# Patient Record
Sex: FEMALE | Race: WHITE | NOT HISPANIC OR LATINO | URBAN - METROPOLITAN AREA
[De-identification: names, ages, dates, MRNs, and addresses within clinical notes are randomized per-mention and may not be internally consistent; named-entity substitution may affect disease eponyms.]

---
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
## 2018-12-12 ENCOUNTER — OFFICE VISIT - RIVER FALLS (OUTPATIENT)
Dept: FAMILY MEDICINE | Facility: CLINIC | Age: 18
End: 2018-12-12

## 2018-12-12 ASSESSMENT — MIFFLIN-ST. JEOR: SCORE: 1307.22

## 2019-01-04 ENCOUNTER — COMMUNICATION - RIVER FALLS (OUTPATIENT)
Dept: FAMILY MEDICINE | Facility: CLINIC | Age: 19
End: 2019-01-04

## 2019-01-04 ENCOUNTER — OFFICE VISIT - RIVER FALLS (OUTPATIENT)
Dept: FAMILY MEDICINE | Facility: CLINIC | Age: 19
End: 2019-01-04

## 2019-01-04 LAB — DEPRECATED S PYO AG THROAT QL EIA: NOT DETECTED

## 2019-01-04 ASSESSMENT — MIFFLIN-ST. JEOR: SCORE: 1319.02

## 2019-01-07 LAB — BACTERIA SPEC CULT: NORMAL

## 2022-02-12 VITALS
OXYGEN SATURATION: 99 % | DIASTOLIC BLOOD PRESSURE: 70 MMHG | TEMPERATURE: 97.7 F | HEIGHT: 62 IN | SYSTOLIC BLOOD PRESSURE: 119 MMHG | HEART RATE: 86 BPM | DIASTOLIC BLOOD PRESSURE: 60 MMHG | HEART RATE: 67 BPM | BODY MASS INDEX: 22.19 KG/M2 | WEIGHT: 125 LBS | WEIGHT: 120.6 LBS | SYSTOLIC BLOOD PRESSURE: 112 MMHG | TEMPERATURE: 98.4 F

## 2022-02-12 VITALS
HEART RATE: 80 BPM | SYSTOLIC BLOOD PRESSURE: 120 MMHG | TEMPERATURE: 98 F | HEIGHT: 62 IN | BODY MASS INDEX: 24.62 KG/M2 | OXYGEN SATURATION: 99 % | WEIGHT: 133.8 LBS | HEIGHT: 62 IN | TEMPERATURE: 97.8 F | SYSTOLIC BLOOD PRESSURE: 118 MMHG | HEART RATE: 60 BPM | BODY MASS INDEX: 25.1 KG/M2 | DIASTOLIC BLOOD PRESSURE: 68 MMHG | WEIGHT: 136.4 LBS | DIASTOLIC BLOOD PRESSURE: 70 MMHG

## 2022-02-12 VITALS
DIASTOLIC BLOOD PRESSURE: 84 MMHG | WEIGHT: 125 LBS | TEMPERATURE: 97.2 F | HEART RATE: 68 BPM | SYSTOLIC BLOOD PRESSURE: 118 MMHG

## 2022-02-12 VITALS
HEART RATE: 64 BPM | WEIGHT: 124 LBS | SYSTOLIC BLOOD PRESSURE: 104 MMHG | HEIGHT: 62 IN | BODY MASS INDEX: 22.82 KG/M2 | DIASTOLIC BLOOD PRESSURE: 66 MMHG | TEMPERATURE: 98.1 F

## 2022-02-15 NOTE — PROGRESS NOTES
Patient:   JOHN DOBBS            MRN: [id number]            FIN: [id number]               Age:   18 years     Sex:  Female     :  2000   Associated Diagnoses:   Family planning   Author:   Kaylan Harmon      Visit Information      Date of Service: 2018 10:19 am  Performing Location: [facility]  Encounter#: [id number]      Chief Complaint   2018 10:33 AM CST  c/o being 2 1/2 weeks late for period, uses OCP and states she never misses a dose. States she does have abnormal menses sometimes though.        Interval History   Concerning symptoms as listed in Chief Complaint above discussed and confirmed with patient  here with her bF Jaime  She is worried she is preg because she didnt get period this past week with plabcebo week of oc's. She has btb at other weeks and then she will stop taking the pills after the BTB and when that ends she will start the pills again.  has used at least 3 different oc's over the last year or so, trying to find something that doesn't cause btb  When she is NOT on Ocs, she has regular predictable periods.   has only used oc's in past  partner rarely uses condoms         Health Status   Allergies:    Allergic Reactions (Selected)  Severity Not Documented  Amoxicillin (Rash)  Ceclor (Hives)   Medications:  (Selected)   Documented Medications  Documented  Microgestin 1.5/30 oral tablet: 1 tab(s), Oral, daily, 0 Refill(s), Type: Maintenance   Problem list:    All Problems  Allergic rhinitis / SNOMED CT 951466487 / Confirmed      Histories   Past Medical History:    Active  Allergic rhinitis ([id number])   Family History:    No family history items have been selected or recorded.   Procedure history:    No active procedure history items have been selected or recorded.   Social History:             No active social history items have been recorded.      Physical Examination   Vital Signs   2018 10:33 AM CST Temperature Tympanic 97.8 DegF  LOW     Peripheral Pulse Rate 60 bpm    Pulse Site Radial artery    HR Method Manual    Systolic Blood Pressure 120 mmHg    Diastolic Blood Pressure 70 mmHg    Mean Arterial Pressure 87 mmHg    BP Site Right arm    BP Method Manual      Measurements from flowsheet : Measurements   12/12/2018 10:33 AM CST Height Measured - Standard 61.5 in    Weight Measured - Standard 133.8 lb    BSA 1.62 m2    Body Mass Index 24.87 kg/m2      General:  Alert and oriented, No acute distress.       Review / Management   Results review:  Lab results: 12/12/2018 10:32 AM CST  U HCG POC                 NEGATIVE  .       Impression and Plan   Diagnosis     Family planning (GIP02-SI Z30.09).     Patient Instructions:       Counseled: Patient, Regarding diagnosis, Regarding treatment, Regarding medications, Verbalized understanding, 15 minutes spent with this pt, all on counseling regarding the use/risks/benefits of various birthcontrol methods including ACHES symptoms and when to start her methods and the importance of back up protection as well as condom use to prevent STI's   specifically counseled on use/risk/benefit of Nexplanon as she is not interested in IUD.  Should go back to using her oc's, cycling continuously and check insurance covereage of Nexpalnon and return for insert if desires. Risk of infection, migration discussed.

## 2022-02-15 NOTE — PROGRESS NOTES
Patient:   JOHN DOBBS            MRN: [id number]            FIN: [id number]               Age:   16 years     Sex:  Female     :  2000   Associated Diagnoses:   Vaginal discharge; Vaginitis   Author:   Jessica Gomez      Chief Complaint   3/1/2017 2:30 PM CST     Pt c/o  brown discharge, vaginal itching x 2 days. Monistat cream is helping.      History of Present Illness   PPC for STD screening, started having intercourse 15 and has had to experiences with it, both same partner, one with condom and one without  due for menses next week, has appt with gyn next week for LIUDMILA start,      Review of Systems   Constitutional:  Negative.    Ear/Nose/Mouth/Throat:  Negative.    Respiratory:  Negative.    Cardiovascular:  Negative.    Gastrointestinal:  Negative.    Genitourinary:  Negative.    Gynecologic:  Negative except as documented in history of present illness.    Hematology/Lymphatics:  Negative.    Endocrine:  Negative.    Immunologic:  Negative.    Neurologic:  Negative.    Psychiatric:  Negative.       Health Status   Allergies:    Allergic Reactions (Selected)  Severity Not Documented  Ceclor (Hives)      Physical Examination   Last Menstrual Period: 2017   Vital Signs   3/1/2017 2:30 PM CST Temperature Tympanic 97.2 DegF  LOW    Peripheral Pulse Rate 68 bpm    Pulse Site Radial artery    HR Method Manual    Systolic Blood Pressure 118 mmHg    Diastolic Blood Pressure 84 mmHg    Mean Arterial Pressure 95 mmHg    BP Site Right arm    BP Method Manual      General:  Alert and oriented, No acute distress.    HENT:  Normocephalic.    Respiratory:  Lungs are clear to auscultation, Respirations are non-labored, No chest wall tenderness.    Cardiovascular:  Normal rate, Regular rhythm, No murmur, No edema.    Gastrointestinal:  Soft, Non-tender, Non-distended, Normal bowel sounds, No organomegaly.    Genitourinary:  No costovertebral angle tenderness.         Labia: Bilateral, Within normal  limits.         Mons pubis: WNL.         Perineum: Within normal limits.         Vulva: Within normal limits.         Urethra: Within normal limits.         Cervix: Discharge, white thick discharge.         Uterus: Within normal limits.         Adnexa: Bilateral, Within normal limits.    Lymphatics:  No lymphadenopathy neck, axilla, groin.    Musculoskeletal:  Normal range of motion, Normal strength, No swelling.    Integumentary:  Warm, Dry, Pink.    Neurologic:  Alert, Oriented, Normal sensory.    Psychiatric:  Cooperative, Appropriate mood & affect.       Review / Management   Results review:  Lab results   3/1/2017 3:06 PM CST Wet Prep Yeast Present    Wet Prep Trichomonas None Seen    Wet Prep Clue Cells None Seen   .       Impression and Plan   Diagnosis     Vaginal discharge (LQU71-RO N89.8).     Vaginitis (CJZ89-DO N76.0).     Patient Instructions:       Counseled: Patient, Regarding diagnosis, Regarding treatment, Regarding medications, Verbalized understanding.    Summary:  discussed potential side effects  condoms encouraged, offered to fill COCs but she declined at this time.    Orders     Orders (Selected)   Prescriptions  Prescribed  Diflucan 150 mg oral tablet: 1 tab(s) ( 150 mg ), PO, Once, # 1 tab(s), 0 Refill(s), Type: Soft Stop, Pharmacy: [id number], 1 tab(s) po once.

## 2022-02-15 NOTE — PROGRESS NOTES
Patient:   JOHN DOBBS            MRN: [id number]            FIN: [id number]               Age:   17 years     Sex:  Female     :  2000   Associated Diagnoses:   Cold   Author:   Patrick White MD      Visit Information      Primary Care Provider (PCP):  NONE ,       Chief Complaint   10/6/2017 12:55 PM CDT   Sore throat, congestion, back pain x2 week.s     Upper Respiratory Symptoms      History of Present Illness             The patient presents with symptoms of an upper respiratory infection.  The symptoms of the upper respiratory infection are described as rhinorrhea, sore throat, nasal congestion, cough and yellow sputum.  The severity of the symptoms associated to the upper respiratory infection is moderate.  The timing/course of upper respiratory infection symptoms fluctuates in intensity.  The symptoms of upper respiratory infection have lasted for 2 week(s).  Exacerbating factors consist of none.  Relieving factors consist of none.  Associated symptoms consist of none and low back pain with cough.        Review of Systems   Constitutional:  Fatigue.    Eye:  Negative.    Ear/Nose/Mouth/Throat:  Nasal congestion.    Respiratory:  Cough, Sputum production, No shortness of breath, No wheezing.    Cardiovascular:  Negative.    Gastrointestinal:  Negative.    Genitourinary:  Negative.    Hematology/Lymphatics:  Negative.    Endocrine:  Negative.    Immunologic:  Negative.    Musculoskeletal:  Negative.    Integumentary:  Negative, No rash.    Neurologic:  Negative.    Psychiatric:  Negative.          All other systems reviewed and negative      Health Status   Allergies:    Allergic Reactions (Selected)  Severity Not Documented  Ceclor (Hives)   Problem list:    All Problems  Allergic rhinitis / SNOMED CT [id number] / Confirmed   Medications:  (Selected)   Prescriptions  Prescribed  Azithromycin 5 Day Dose Pack 250 mg oral tablet: 2 tabs today then 1 a day for 4 days, PO, qAM, x 5 day(s),  Instructions: as directed on package labeling, # 6 tab(s), 0 Refill(s), Type: Acute, Pharmacy: [facility] [id number], 2 tabs today then 1 a day for 4 days po qam,x5 day(s),Instr:as direct...  ProAir HFA 90 mcg/inh inhalation aerosol: 2 puff(s), inh, q4-6 hrs, PRN: as needed for coughing, # 1 EA, 2 Refill(s), Type: Maintenance, Pharmacy: [facility] [id number], 2 puff(s) inh q4-6 hrs,PRN:as needed for coughing  Documented Medications  Documented  Microgestin 1.5/30 oral tablet: 1 tab(s), po, daily, 0 Refill(s), Type: Maintenance  ZyrTEC 10 mg oral tablet: 1 tab(s) ( 10 mg ), po, daily, 0 Refill(s), Type: Maintenance      Histories   Past Medical History:    Active  Allergic rhinitis ([id number])   Family History:    No family history items have been selected or recorded.   Procedure history:    No active procedure history items have been selected or recorded.   Social History:             No active social history items have been recorded.      Physical Examination   Vital Signs   10/6/2017 12:55 PM CDT Temperature Tympanic 98.4 DegF    Peripheral Pulse Rate 86 bpm    Systolic Blood Pressure 119 mmHg    Diastolic Blood Pressure 70 mmHg    Mean Arterial Pressure 86 mmHg      Measurements from flowsheet : Measurements   10/6/2017 12:55 PM CDT   Weight Measured - Standard                125.0 lb     General:  Alert and oriented, No acute distress.    Eye:  Normal conjunctiva.    HENT:  Normocephalic, Tympanic membranes are clear, Oral mucosa is moist, No pharyngeal erythema.    Neck:  Supple, Non-tender, No lymphadenopathy, No thyromegaly.    Respiratory:  Breath sounds are equal, Symmetrical chest wall expansion.         Respirations: Are within normal limits.         Pattern: Regular.         Breath sounds: Bilateral, Within normal limits.    Cardiovascular:  Normal rate, Regular rhythm, No murmur, Normal peripheral perfusion, No edema.    Gastrointestinal:  Soft, Non-tender, Non-distended, Normal bowel sounds,  No organomegaly.    Genitourinary:  No costovertebral angle tenderness.    Integumentary:  Warm, Dry, No rash.       Impression and Plan   Diagnosis     Cold (OXW64-ID J00).     Course:  Not progressing as expected.    Plan:  Given the two-week history will cover for atypical's  fu 1 week if not better sooner if worse.         Refer to: Reviewed when to return to clinic and anticipatory guidance. Also reviewed to return sooner if no improvement or worsening, Reviewed benadryl dosing for symptom relief.    Patient Instructions:       Counseled: Patient, Regarding diagnosis, Regarding treatment, Regarding medications, Regarding activity, Verbalized understanding.    Orders

## 2022-02-15 NOTE — NURSING NOTE
Comprehensive Intake Entered On:  1/4/2019 10:56 AM CST    Performed On:  1/4/2019 10:48 AM CST by Sindy Javier               Summary   Chief Complaint :   c/o dry cough x1 day but coughed up bloody sputum yesterday, sore throat x3 days, and some nausea today.    Menstrual Status :   Menarcheal   Weight Measured :   136.4 lb(Converted to: 136 lb 6 oz, 61.87 kg)    Height Measured :   61.5 in(Converted to: 5 ft 1 in, 156.21 cm)    Body Mass Index :   25.35 kg/m2 (HI)    Body Surface Area :   1.64 m2   Systolic Blood Pressure :   118 mmHg   Diastolic Blood Pressure :   68 mmHg   Mean Arterial Pressure :   85 mmHg   Peripheral Pulse Rate :   80 bpm   BP Site :   Right arm   Pulse Site :   Radial artery   BP Method :   Manual   HR Method :   Manual   Temperature Tympanic :   98.0 DegF(Converted to: 36.7 DegC)    Oxygen Saturation :   99 %   Sindy Javier - 1/4/2019 10:48 AM CST   Health Status   Allergies Verified? :   Yes   Medication History Verified? :   Yes   Medical History Verified? :   Yes   Pre-Visit Planning Status :   Completed   Tobacco Use? :   Never smoker   Sindy Javier - 1/4/2019 10:48 AM CST   Consents   Consent for Immunization Exchange :   Consent Granted   Consent for Immunizations to Providers :   Consent Granted   Sindy Javier - 1/4/2019 10:48 AM CST   Meds / Allergies   (As Of: 1/4/2019 10:56:08 AM CST)   Allergies (Active)   amoxicillin  Estimated Onset Date:   Unspecified ; Reactions:   rash ; Created By:   Luke Ramey MD; Reaction Status:   Active ; Category:   Drug ; Substance:   amoxicillin ; Type:   Allergy ; Updated By:   Luke Ramey MD; Reviewed Date:   1/4/2019 10:55 AM CST      Ceclor  Estimated Onset Date:   Unspecified ; Reactions:   hives ; Created By:   Radha Matos CMA; Reaction Status:   Active ; Category:   Drug ; Substance:   Ceclor ; Type:   Allergy ; Updated By:   Radha Matos CMA; Reviewed Date:   1/4/2019 10:55 AM CST        Medication List    (As Of: 1/4/2019 10:56:08 AM CST)   Home Meds    ethinyl estradiol-norethindrone  :   ethinyl estradiol-norethindrone ; Status:   Documented ; Ordered As Mnemonic:   Microgestin 1.5/30 oral tablet ; Simple Display Line:   1 tab(s), Oral, daily, 0 Refill(s) ; Catalog Code:   ethinyl estradiol-norethindrone ; Order Dt/Tm:   12/12/2018 10:43:20 AM

## 2022-02-15 NOTE — PROGRESS NOTES
Chief Complaint    Here for Hives all over body they began yesturday, she started amoxicillian last wed due to sinus infection, she did not take it today, she denies fevers, she says she is recovering from a Liver Infection, unsure what she means by this.  History of Present Illness      Hives started yesterday and increasing. Began noticing bumps following soccer practice yesterday. Dots turned to bumps last night. Woke up with itching in the middle of the night. Given amoxicillin last week and currently taking it.  Review of Systems      No fevers, vomiting, shortness of breath.        Had jaundice with bilirbubin 14 and out of school for 9 days. Diagnosed with acute hepatitis that has resolved. Took zithromax 3 weeks ago.   Physical Exam   Vitals & Measurements    T: 97.7(Tympanic)  HR: 67(Peripheral)  BP: 112/60  SpO2: 99%     HT: 61.5 in  WT: 120.6 lb  BMI: 22.42       General: No acute distress      Neck: Without lymphadenopathy       Lungs: Clear       Heart: Regular rate and rhythm       Skin: Blanching erythematous rash with some highs on her legs, arms, abdomen, chest and back  Assessment/Plan   Rash: will treat with prednisone and stop amoxicillin.  I would consider this an amoxicillin allergy.  Follow-up if not improving.  Patient Information     Name:JOHN DOBBS      Address:      [address] [place] [id number]     Sex:Female     YOB: 2000     [phone]     MRN:[id number]     FIN:[id number]     Location:[facility]     Date of Service:10/25/2017      Primary Care Physician:       NONE ,   Problem List/Past Medical History    Ongoing     Allergic rhinitis    Historical  Medications   No active medications  Allergies    Ceclor (hives)  Social History    Smoking Status - 10/25/2017     Never smoker  Lab Results      Results (Last 90 days)      No results located.

## 2022-02-15 NOTE — PROGRESS NOTES
Patient:   JOHN DOBBS            MRN: [id number]            FIN: [id number]               Age:   18 years     Sex:  Female     :  2000   Associated Diagnoses:   Sore throat   Author:   Kaylan Harmon      Visit Information      Date of Service: 2019 10:39 am  Performing Location: [facility]  Encounter#: [id number]      Primary Care Provider (PCP):  NONE ,       Referring Provider:  Kaylan Harmon    NPI# [id number]      Chief Complaint   2019 10:48 AM CST    c/o dry cough x1 day but coughed up bloody sputum yesterday, sore throat x3 days, and some nausea today.      History of Present Illness   reviewed presenting problem as above with patient  started with the sore throat and that is getting worse  no fever, no OTC remedies used except Nyquil last night to sleep  po intake adequate  harsh cough  non smoker  was with relatives who had been ill with respiratory problems      Health Status   Allergies:    Allergic Reactions (Selected)  Severity Not Documented  Amoxicillin (Rash)  Ceclor (Hives)   Medications:  (Selected)   Documented Medications  Documented  Microgestin 1.5/30 oral tablet: 1 tab(s), Oral, daily, 0 Refill(s), Type: Maintenance   Problem list:    All Problems  Allergic rhinitis / SNOMED CT [id number] / Confirmed      Histories   Past Medical History:    Active  Allergic rhinitis ([id number])   Family History:    No family history items have been selected or recorded.   Procedure history:    No active procedure history items have been selected or recorded.   Social History:             No active social history items have been recorded.      Physical Examination   Vital Signs   2019 10:48 AM CST Temperature Tympanic 98.0 DegF    Peripheral Pulse Rate 80 bpm    Pulse Site Radial artery    HR Method Manual    Systolic Blood Pressure 118 mmHg    Diastolic Blood Pressure 68 mmHg    Mean Arterial Pressure 85 mmHg    BP Site Right arm    BP Method Manual    Oxygen  Saturation 99 %      Measurements from flowsheet : Measurements   1/4/2019 10:48 AM CST Height Measured - Standard 61.5 in    Weight Measured - Standard 136.4 lb    BSA 1.64 m2    Body Mass Index 25.35 kg/m2  HI      General:  Alert and oriented, No acute distress.    Eye:  Normal conjunctiva.    HENT:  Tympanic membranes are clear, Normal hearing, Oral mucosa is moist, No pharyngeal erythema, No sinus tenderness.    Neck:  Supple, Non-tender, No lymphadenopathy.    Respiratory:  Lungs are clear to auscultation, Respirations are non-labored, Breath sounds are equal, Symmetrical chest wall expansion.    Cardiovascular:  Normal rate, Regular rhythm, No murmur.    Musculoskeletal:  Normal range of motion, Normal gait.    Integumentary:  Warm, Dry, Pink, No rash.    Neurologic:  Alert, Oriented.    Psychiatric:  Cooperative.       Review / Management   Results review:  neg rapid strep.       Impression and Plan   Diagnosis     Sore throat (HCG31-SI J02.9).     Patient Instructions:       Counseled: Patient, Regarding diagnosis, Regarding treatment, Regarding medications, Verbalized understanding, Counseled on symptomatic management. Return to clinic for re evaluation if worsening, simply not improving, or failure to resolve.   .

## 2022-02-15 NOTE — TELEPHONE ENCOUNTER
"---------------------  From: Kyalan Harmon   To: JOHN DOBBS    Sent: 1/7/2019 1:50:50 PM CST      The culture of your throat is NEGATIVE for group A Streptococcus, the type of Streptococcus that causes \"Strep Throat\".  Please call or return to see me if you are not feeling better.  Thank you.  Results:  Date Result Name Value   1/4/2019 11:23 AM Culture Throat See comment  "

## 2022-02-15 NOTE — PROGRESS NOTES
Patient:   JOHN DOBBS            MRN: [id number]            FIN: [id number]               Age:   17 years     Sex:  Female     :  2000   Associated Diagnoses:   Seasonal allergies; Asthma, mild intermittent   Author:   Bowen Zavala MD      Visit Information      Date of Service: 2017 02:26 pm  Performing Location: [facility]  Encounter#: [id number]      Primary Care Provider (PCP):  Not recorded.      Referring Provider:  No referring provider recorded for selected visit.      Chief Complaint   2017 2:38 PM CDT     Here with c/o cough for 5 days.  Has hx of seasonal allergies which she takes zrytec qd.  [employer]otton player.              Additional Information:No additional information recorded during visit.   Chief complaint and symptoms as noted above and confirmed with patient.  Recent lab and diagnostic studies reviewed with patient      History of Present Illness   2017: 10 day history of persistent upper respiratory tract symptoms with nasal congestion and semi-productive cough.  She has a history of intermittent coughing illnesses typically around spring time.  She has been diagnosed with seasonal allergies and takes Zyrtec daily.  Never has tried intranasal or inhaled steroid.  No inhalation use.  Exercise and practice does aggravate her breathing.  Currently has been using Mucinex D for the past 3 days.       Review of Systems   Constitutional:  No fever, No chills.    Eye:  Negative except as documented in history of present illness.    Ear/Nose/Mouth/Throat:  Nasal congestion.    Respiratory:  Cough, Sputum production, No shortness of breath.    Cardiovascular:  No chest pain, No palpitations, No peripheral edema, No syncope.    Gastrointestinal:  No nausea, No vomiting, No abdominal pain.    Genitourinary:  No dysuria, No hematuria.    Hematology/Lymphatics:  Negative except as documented in history of present illness.    Endocrine:  No excessive thirst, No polyuria.     Immunologic:  No recurrent fevers.    Musculoskeletal:  No joint pain, No muscle pain.    Neurologic:  Alert and oriented X4, No numbness, No tingling, No headache.       Health Status   Allergies:    Allergic Reactions (Selected)  Severity Not Documented  Ceclor (Hives)   Medications:  (Selected)   Prescriptions  Prescribed  ProAir HFA 90 mcg/inh inhalation aerosol: 2 puff(s), inh, q4-6 hrs, PRN: as needed for coughing, # 1 EA, 2 Refill(s), Type: Maintenance, Pharmacy: [id number], 2 puff(s) inh q4-6 hrs,PRN:as needed for coughing  Qvar 80 mcg/inh inhalation aerosol: ( 80 mcg ), inh, bid, # 1 EA, 2 Refill(s), Type: Maintenance, Pharmacy: [facility] [id number], Hold; pt to request if needed, 80 mcg inh bid  Documented Medications  Documented  Microgestin 1.5/30 oral tablet: 1 tab(s), po, daily, 0 Refill(s), Type: Maintenance  ZyrTEC 10 mg oral tablet: 1 tab(s) ( 10 mg ), po, daily, 0 Refill(s), Type: Maintenance   Problem list:    No problem items selected or recorded.      Histories   Past Medical History:    No active or resolved past medical history items have been selected or recorded.   Family History:    No family history items have been selected or recorded.   Procedure history:    No active procedure history items have been selected or recorded.   Social History:             No active social history items have been recorded.      Physical Examination   vital signs stable, as noted above   Vital Signs   4/5/2017 2:38 PM CDT Temperature Temporal 98.1 DegF    Peripheral Pulse Rate 64 bpm    Systolic Blood Pressure 104 mmHg    Diastolic Blood Pressure 66 mmHg    Mean Arterial Pressure 79 mmHg      Measurements from flowsheet : Measurements   4/5/2017 2:38 PM CDT Height Measured - Standard 61.5 in    Weight Measured - Standard 124 lb    BSA 1.56 m2    Body Mass Index 23.05 kg/m2    Body Mass Index Percentile 66.00      General:  Alert and oriented, No acute distress.    Eye:  Extraocular  movements are intact.    HENT:  Normocephalic, Oral mucosa is moist, No sinus tenderness.    Neck:  Supple, No carotid bruit, No jugular venous distention, No lymphadenopathy.    Respiratory:  Respirations are non-labored, faint, occasional expiratory rhonchi.    Cardiovascular:  Normal rate, Regular rhythm, No murmur, No edema.    Musculoskeletal:  Normal range of motion.    Neurologic:  Alert, Oriented.    Cognition and Speech:  Oriented, Speech clear and coherent.    Psychiatric:  Appropriate mood & affect.       Review / Management   Results review:  Lab results   3/1/2017 3:09 PM CST Chlam/GC Comments See comment    Chlamydia RNA NOT DETECTED    GC RNA NOT DETECTED   3/1/2017 3:06 PM CST Wet Prep Yeast Present    Wet Prep Trich None Seen    Wet Prep Clue Cells None Seen   .       Impression and Plan   Diagnosis     Seasonal allergies ([id number] J30.2).     Asthma, mild intermittent ([id number] J45.20).       .) cough   - likely some URI component, suspect overlapping seasonal allergies/asthma   - continue with mucinex/Sudafed and zyrtec   - Rx for albuterol   - if symptoms persist, provided Rx for Qvar BID for 1-2 week intervals - I suspect undiagnosed asthma for some time.